# Patient Record
Sex: FEMALE | Race: WHITE
[De-identification: names, ages, dates, MRNs, and addresses within clinical notes are randomized per-mention and may not be internally consistent; named-entity substitution may affect disease eponyms.]

---

## 2018-05-31 ENCOUNTER — HOSPITAL ENCOUNTER (OUTPATIENT)
Dept: HOSPITAL 17 - PHSDC | Age: 48
Discharge: HOME | End: 2018-05-31
Attending: SURGERY
Payer: COMMERCIAL

## 2018-05-31 VITALS — BODY MASS INDEX: 32.25 KG/M2 | HEIGHT: 62 IN | WEIGHT: 175.27 LBS

## 2018-05-31 VITALS
DIASTOLIC BLOOD PRESSURE: 76 MMHG | OXYGEN SATURATION: 97 % | HEART RATE: 82 BPM | RESPIRATION RATE: 16 BRPM | SYSTOLIC BLOOD PRESSURE: 123 MMHG | TEMPERATURE: 98.1 F

## 2018-05-31 DIAGNOSIS — F17.200: ICD-10-CM

## 2018-05-31 DIAGNOSIS — F32.9: ICD-10-CM

## 2018-05-31 DIAGNOSIS — G56.21: ICD-10-CM

## 2018-05-31 DIAGNOSIS — F41.9: ICD-10-CM

## 2018-05-31 DIAGNOSIS — K21.9: ICD-10-CM

## 2018-05-31 DIAGNOSIS — G56.01: Primary | ICD-10-CM

## 2018-05-31 PROCEDURE — 01710 ANES PX NRV MUSC UPR A&E NOS: CPT

## 2018-05-31 PROCEDURE — 64719 REVISE ULNAR NERVE AT WRIST: CPT

## 2018-05-31 PROCEDURE — 64721 CARPAL TUNNEL SURGERY: CPT

## 2018-05-31 PROCEDURE — 64718 REVISE ULNAR NERVE AT ELBOW: CPT

## 2018-05-31 NOTE — MP
cc:

Jeffy Killian MD

****

 

 

DATE OF OPERATION:

05/31/2018

 

DATE OF OPERATION:

05/31/2018

 

POSTOPERATIVE DIAGNOSES:

1.  Right carpal tunnel syndrome.

2.  Right cubital tunnel syndrome.

3.  Right ulnar neuropathy.

 

PROCEDURE PERFORMED:

1.  Right open carpal tunnel release.

2.  Right ulnar nerve release at the wrist.

3.  Right ulnar nerve release at the elbow in situ.

 

SURGEON:

Jeffy Killian III, MD

 

PROCEDURE:

The patient was brought to the operating room, placed upon the 

operating table.  After the correct site and side of surgery were 

verified by members of each team in the room multiple times including 

the patient and myself and, after adequate preoperative markings and 

preoperative written consent was verified by everyone, after adequate 

preoperative timeout was performed to everyone's satisfaction, after 

adequate general anesthesia was achieved, the right upper extremity 

was prepped and draped in traditional sterile surgical fashion.  A 

50:50 mixture of 2% plain lidocaine and 0.5% plain Marcaine was 

infiltrated in the skin and subcutaneous tissue at the areas of the 

planned incisions.  The limb was exsanguinated with an Ace wrap and a 

highly placed well-padded axillary tourniquet was inflated to 200 mmHg

for a total of 25 minutes.

 

A longitudinally oriented incision at the base of the palm was made 

and carried down through skin and subcutaneous tissue.  Blunt 

dissection was performed.  The transverse carpal ligament was 

identified and divided to the ulnar side of its midline from its 

proximal-most to its distal-most extents, completely freeing the 

carpal tunnel contents.  These had a notable rebound.  There was no 

other anatomic abnormality.  Everything appeared to be in continuity 

and there was no evidence of any mass effect.  Thorough irrigation was

performed.  The skin edges were reapproximated using running 4-0 nylon

suture.  A hockey stick-shaped incision was made over the ulnar nerve 

and Guyon's canal, carried down through skin and subcutaneous tissue. 

Blunt dissection was performed.  The ulnar nerve was identified 

proximally and dissected distally, freeing it from several small bands

of constricting tissue, which were obviously pushing into the nerve, 

mostly in the distal forearm, but also a small area in the proximal 

palm.  There were no other anatomic abnormalities.  Thorough 

irrigation was performed.  The skin edges were reapproximated using 

running 4-0 nylon suture.

 

Attention was paid to the elbow, which was held at a 45-degree angle. 

A hockey stick-shaped incision 1 cm anterior to the ulnar groove was 

made.  Blunt dissection was performed.  Bipolar electrocautery was 

used as needed.  The ulnar groove was identified and entry into the 

cubital tunnel at the posterior-most aspect was made, identifying the 

ulnar nerve.  This was then opened proximally to the [place] and distally, all the way to the bifurcation in the flexor 

forearm musculature.  There was an area of significant stricture 

between the ulnar groove and the bifurcation distally and this was 

completely freed and the nerve appeared otherwise non-compromised.  

There were no other anatomic abnormalities.  Thorough irrigation with 

 saline was performed.  The axillary tourniquet was released and the

hand and all the fingers became immediately soft, pink and warm and 

had brisk capillary refill.  There was no evidence of any bleeding or 

hematoma formation.  Deep subcutaneous tissues were reapproximated 

using 3-0 Vicryl sutures and the skin edges reapproximated using 3-0 

nylon suture.  The hand and arm were thoroughly cleansed and dried.  

Betadine and Adaptic dressings were placed on top the wound, followed 

by a bulky circumferential dressing.

 

Capillary refill is less than 2 seconds in all fingertips.  The 

patient was awakened from anesthesia and transported to the 

postanesthesia care unit awake and in stable condition at the end of 

the case.  Sponge, needle and instrument counts were correct at the 

end of the case as reported by the nurses in the room.

 

 

__________________________________

MD JAYLAN Christie/PEDRO

D: 05/31/2018, 03:03 PM

T: 05/31/2018, 03:28 PM

Visit #: [id number]

Job #: [id number]